# Patient Record
Sex: FEMALE | Race: WHITE | NOT HISPANIC OR LATINO | URBAN - METROPOLITAN AREA
[De-identification: names, ages, dates, MRNs, and addresses within clinical notes are randomized per-mention and may not be internally consistent; named-entity substitution may affect disease eponyms.]

---

## 2019-08-22 ENCOUNTER — MEDICAL CORRESPONDENCE (OUTPATIENT)
Dept: HEALTH INFORMATION MANAGEMENT | Facility: CLINIC | Age: 24
End: 2019-08-22

## 2020-12-03 ENCOUNTER — TRANSFERRED RECORDS (OUTPATIENT)
Dept: HEALTH INFORMATION MANAGEMENT | Facility: CLINIC | Age: 25
End: 2020-12-03

## 2022-11-03 ENCOUNTER — OFFICE VISIT (OUTPATIENT)
Dept: FAMILY MEDICINE | Facility: CLINIC | Age: 27
End: 2022-11-03
Payer: COMMERCIAL

## 2022-11-03 VITALS
SYSTOLIC BLOOD PRESSURE: 106 MMHG | TEMPERATURE: 97.4 F | OXYGEN SATURATION: 96 % | HEIGHT: 63 IN | HEART RATE: 66 BPM | BODY MASS INDEX: 22.02 KG/M2 | RESPIRATION RATE: 12 BRPM | DIASTOLIC BLOOD PRESSURE: 69 MMHG | WEIGHT: 124.25 LBS

## 2022-11-03 DIAGNOSIS — F42.2 MIXED OBSESSIONAL THOUGHTS AND ACTS: Primary | ICD-10-CM

## 2022-11-03 DIAGNOSIS — R00.2 PALPITATIONS: ICD-10-CM

## 2022-11-03 PROBLEM — G43.909 MIGRAINE HEADACHE: Status: ACTIVE | Noted: 2017-11-03

## 2022-11-03 PROBLEM — N87.9 CERVICAL ATYPIA: Status: ACTIVE | Noted: 2017-11-03

## 2022-11-03 RX ORDER — SEGESTERONE ACETATE AND ETHINYL ESTRADIOL 103; 17.4 MG/1; MG/1
RING VAGINAL
COMMUNITY
Start: 2021-10-03 | End: 2023-09-22

## 2022-11-03 RX ORDER — PAROXETINE 10 MG/1
10 TABLET, FILM COATED ORAL DAILY
COMMUNITY
Start: 2019-11-03 | End: 2022-11-03

## 2022-11-03 RX ORDER — PAROXETINE 10 MG/1
10 TABLET, FILM COATED ORAL DAILY
Qty: 90 TABLET | Refills: 3 | Status: SHIPPED | OUTPATIENT
Start: 2022-11-03 | End: 2023-09-22

## 2022-11-03 RX ORDER — PROPRANOLOL HCL 60 MG
60 CAPSULE, EXTENDED RELEASE 24HR ORAL DAILY
COMMUNITY
Start: 2022-07-03 | End: 2022-11-03

## 2022-11-03 RX ORDER — PROPRANOLOL HCL 60 MG
60 CAPSULE, EXTENDED RELEASE 24HR ORAL DAILY
Qty: 90 CAPSULE | Refills: 3 | Status: SHIPPED | OUTPATIENT
Start: 2022-11-03 | End: 2023-09-22

## 2022-11-03 RX ORDER — RIZATRIPTAN BENZOATE 5 MG/1
5 TABLET, ORALLY DISINTEGRATING ORAL PRN
COMMUNITY
Start: 2022-09-13 | End: 2023-05-23

## 2022-11-03 ASSESSMENT — ENCOUNTER SYMPTOMS
PARESTHESIAS: 0
HEARTBURN: 0
SHORTNESS OF BREATH: 0
CONSTIPATION: 0
MYALGIAS: 0
PALPITATIONS: 0
CHILLS: 0
HEMATOCHEZIA: 0
SORE THROAT: 0
DYSURIA: 0
FREQUENCY: 0
ARTHRALGIAS: 0
DIARRHEA: 0
JOINT SWELLING: 0
FEVER: 0
NERVOUS/ANXIOUS: 0
WEAKNESS: 0
COUGH: 0
ABDOMINAL PAIN: 0
HEADACHES: 1
BREAST MASS: 0
HEMATURIA: 0
DIZZINESS: 0
NAUSEA: 0
EYE PAIN: 0

## 2022-11-03 ASSESSMENT — PATIENT HEALTH QUESTIONNAIRE - PHQ9
SUM OF ALL RESPONSES TO PHQ QUESTIONS 1-9: 1
5. POOR APPETITE OR OVEREATING: MORE THAN HALF THE DAYS

## 2022-11-03 ASSESSMENT — ANXIETY QUESTIONNAIRES
1. FEELING NERVOUS, ANXIOUS, OR ON EDGE: SEVERAL DAYS
3. WORRYING TOO MUCH ABOUT DIFFERENT THINGS: MORE THAN HALF THE DAYS
2. NOT BEING ABLE TO STOP OR CONTROL WORRYING: MORE THAN HALF THE DAYS
GAD7 TOTAL SCORE: 10
7. FEELING AFRAID AS IF SOMETHING AWFUL MIGHT HAPPEN: SEVERAL DAYS
GAD7 TOTAL SCORE: 10
6. BECOMING EASILY ANNOYED OR IRRITABLE: MORE THAN HALF THE DAYS
5. BEING SO RESTLESS THAT IT IS HARD TO SIT STILL: NOT AT ALL
IF YOU CHECKED OFF ANY PROBLEMS ON THIS QUESTIONNAIRE, HOW DIFFICULT HAVE THESE PROBLEMS MADE IT FOR YOU TO DO YOUR WORK, TAKE CARE OF THINGS AT HOME, OR GET ALONG WITH OTHER PEOPLE: SOMEWHAT DIFFICULT

## 2022-11-03 NOTE — NURSING NOTE
"    27 year old  Chief Complaint   Patient presents with     [facility]     Obsessive Compulsive Disorder     Discuss who to see for treatment       Blood pressure 106/69, pulse 66, temperature 97.4  F (36.3  C), temperature source Skin, resp. rate 12, height 1.6 m (5' 2.99\"), weight 56.4 kg (124 lb 4 oz), last menstrual period 10/28/2022, SpO2 96 %. Body mass index is 22.02 kg/m .  Patient Active Problem List   Diagnosis     Cervical atypia     History of palpitations     Migraine headache       Wt Readings from Last 2 Encounters:   11/03/22 56.4 kg (124 lb 4 oz)     BP Readings from Last 3 Encounters:   11/03/22 106/69         Current Outpatient Medications   Medication     PARoxetine (PAXIL) 10 MG tablet     propranolol ER (INDERAL LA) 60 MG 24 hr capsule     rizatriptan (MAXALT-MLT) 5 MG ODT     Segesterone-Ethinyl Estradiol (ANNOVERA) 0.15-0.013 MG/24HR RING     No current facility-administered medications for this visit.       Social History     Tobacco Use     Smoking status: Never     Smokeless tobacco: Never   Vaping Use     Vaping Use: Never used   Substance Use Topics     Alcohol use: Not Currently     Drug use: Never       Health Maintenance Due   Topic Date Due     ADVANCE CARE PLANNING  Never done     HEPATITIS B IMMUNIZATION (1 of 3 - 3-dose series) Never done     HIV SCREENING  Never done     HEPATITIS C SCREENING  Never done     PAP  Never done     COVID-19 Vaccine (3 - Booster for Moderna series) 05/04/2021     INFLUENZA VACCINE (1) 09/01/2022     YEARLY PREVENTIVE VISIT  11/03/2022       No results found for: PAP      November 3, 2022 2:54 PM    "

## 2022-11-03 NOTE — PROGRESS NOTES
"CC: Establish Care and Obsessive Compulsive Disorder (Discuss who to see for treatment)    SUBJECTIVE: Dorothy is a 27 year old female who comes in to [facility] care. Prior PCP in [place], recently moved here (3 weeks ago). Records received and reviewed.     PMH, PSH, FH, allergies, and medications reviewed and updated in Epic.     Migraines - Takes propranolol 60 mg daily and rizatriptan as needed, has a migraine about 2x/month. No side effects from propranolol.     PVCs - One episode of SVT in 2016, but otherwise rare PVCs. Metoprolol first, then switched to propranolol d/t beneficial effects for migraines as well.     OCD - Diagnosed by a therapist, has struggled with anxiety her whole life and in 2017 was diagnosed. Was having panic attacks and obsessive-compulsive behaviors. Was started on paroxetine 10 mg daily. Has really helped. In grad school, had a lot of health compulsions, but lately worries about feeling contaminated nicholas with walking barefoot.      Abnormal pap smear - Had LEEP in 2017, has had 2 normal pap smears in a row, had her last pap smear this fall.      Nuvaring for contraception.  plans on vasectomy.      SH: No tobacco use. No alcohol use, quit drinking 2 years ago. No drug use. 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 Relationships: .     OBJECTIVE:   /69 (BP Location: Left arm, Patient Position: Sitting, Cuff Size: Adult Regular)   Pulse 66   Temp 97.4  F (36.3  C) (Skin)   Resp 12   Ht 1.6 m (5' 2.99\")   Wt 56.4 kg (124 lb 4 oz)   LMP 10/28/2022 (Exact Date)   SpO2 96%   BMI 22.02 kg/m    General: Alert and oriented in no acute distress.  Skin: Clear without lesions or rashes.   HEENT: PERRL. EOMI. Supple neck.  Cardio: RRR, normal S1 and S2, without murmurs, rubs, or gallops appreciated.    Resp: CTA bilaterally. Normal respiratory effort.   Psych: Mood and affect appropriate.    ASSESSMENT/PLAN:   Dorothy was seen today for establish " care and obsessive compulsive disorder. Due for pap smear next year.     Mixed obsessional thoughts and acts  Will refer to mental health  for therapy for OCD. Paroxetine refilled.   -     Adult Mental Health  Referral; Future  -     PARoxetine (PAXIL) 10 MG tablet; Take 1 tablet (10 mg) by mouth daily    Palpitations  The current medical regimen is effective;  continue present plan and medications.  -     propranolol ER (INDERAL LA) 60 MG 24 hr capsule; Take 1 capsule (60 mg) by mouth daily    Worrisome signs and symptoms were discussed with Dorothy and she was instructed to return to the clinic for concerning symptoms or to call with questions.    Return in about 1 year (around 11/3/2023) for physical.    Mireille Pena MD  Family Medicine      Answers for HPI/ROS submitted by the patient on 11/3/2022  Frequency of exercise:: 2-3 days/week  Getting at least 3 servings of Calcium per day:: Yes  Diet:: Regular (no restrictions)  Taking medications regularly:: Yes  Medication side effects:: None  Bi-annual eye exam:: NO  Dental care twice a year:: Yes  Sleep apnea or symptoms of sleep apnea:: None  abdominal pain: No  Blood in stool: No  Blood in urine: No  chest pain: No  chills: No  congestion: No  constipation: No  cough: No  diarrhea: No  dizziness: No  ear pain: No  eye pain: No  nervous/anxious: No  fever: No  frequency: No  genital sores: No  headaches: Yes  hearing loss: No  heartburn: No  arthralgias: No  joint swelling: No  peripheral edema: No  mood changes: No  myalgias: No  nausea: No  dysuria: No  palpitations: No  Skin sensation changes: No  sore throat: No  urgency: No  rash: No  shortness of breath: No  visual disturbance: No  weakness: No  pelvic pain: No  vaginal bleeding: No  vaginal discharge: No  tenderness: No  breast mass: No  breast discharge: No  Additional concerns today:: Yes  Duration of exercise:: 15-30 minutes

## 2022-11-03 NOTE — Clinical Note
Arnold Crawford, I placed a mental health referral for counseling for OCD. Dorothy has diagnosed OCD and moved here from [place] last month and wants to re-establish with a therapist. Thanks!

## 2023-01-20 ENCOUNTER — MYC MEDICAL ADVICE (OUTPATIENT)
Dept: FAMILY MEDICINE | Facility: CLINIC | Age: 28
End: 2023-01-20

## 2023-01-20 DIAGNOSIS — K12.0 CANKER SORES ORAL: Primary | ICD-10-CM

## 2023-01-20 RX ORDER — TRIAMCINOLONE ACETONIDE 0.1 %
PASTE (GRAM) DENTAL
Qty: 5 G | Refills: 1 | Status: SHIPPED | OUTPATIENT
Start: 2023-01-20 | End: 2024-04-05

## 2023-02-12 ENCOUNTER — HEALTH MAINTENANCE LETTER (OUTPATIENT)
Age: 28
End: 2023-02-12

## 2023-02-27 ENCOUNTER — VIRTUAL VISIT (OUTPATIENT)
Dept: PSYCHOLOGY | Facility: CLINIC | Age: 28
End: 2023-02-27
Attending: FAMILY MEDICINE
Payer: COMMERCIAL

## 2023-02-27 DIAGNOSIS — F41.1 GAD (GENERALIZED ANXIETY DISORDER): Primary | ICD-10-CM

## 2023-02-27 DIAGNOSIS — F42.2 MIXED OBSESSIONAL THOUGHTS AND ACTS: ICD-10-CM

## 2023-02-27 PROCEDURE — 90791 PSYCH DIAGNOSTIC EVALUATION: CPT | Mod: VID

## 2023-02-27 ASSESSMENT — COLUMBIA-SUICIDE SEVERITY RATING SCALE - C-SSRS
1. IN THE PAST MONTH, HAVE YOU WISHED YOU WERE DEAD OR WISHED YOU COULD GO TO SLEEP AND NOT WAKE UP?: NO
3. HAVE YOU BEEN THINKING ABOUT HOW YOU MIGHT KILL YOURSELF?: NO
6. HAVE YOU EVER DONE ANYTHING, STARTED TO DO ANYTHING, OR PREPARED TO DO ANYTHING TO END YOUR LIFE?: NO
2. HAVE YOU ACTUALLY HAD ANY THOUGHTS OF KILLING YOURSELF IN THE PAST MONTH?: NO
4. HAVE YOU HAD THESE THOUGHTS AND HAD SOME INTENTION OF ACTING ON THEM?: NO
5. HAVE YOU STARTED TO WORK OUT OR WORKED OUT THE DETAILS OF HOW TO KILL YOURSELF? DO YOU INTEND TO CARRY OUT THIS PLAN?: NO
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
## 2023-03-23 ENCOUNTER — VIRTUAL VISIT (OUTPATIENT)
Dept: PSYCHOLOGY | Facility: CLINIC | Age: 28
End: 2023-03-23
Payer: COMMERCIAL

## 2023-03-23 DIAGNOSIS — F41.1 GAD (GENERALIZED ANXIETY DISORDER): Primary | ICD-10-CM

## 2023-03-23 PROCEDURE — 90834 PSYTX W PT 45 MINUTES: CPT | Mod: VID

## 2023-03-23 NOTE — PROGRESS NOTES
[facility]                                     Progress Note    Patient Name: Dorothy Walker  Date: 3/23/23         Service Type: Individual      Session Start Time: 9:05 am  Session End Time: 9:45 am     Session Length: 40 min    Session #: 2    Attendees: Client attended alone    Service Modality:  Video Visit:      Provider verified identity through the following two step process.  Patient provided:  Patient is known previously to provider    Telemedicine Visit: The patient's condition can be safely assessed and treated via synchronous audio and visual telemedicine encounter.      Reason for Telemedicine Visit: Patient convenience (e.g. access to timely appointments / distance to available provider)    Originating Site (Patient Location): Patient's home    Distant Site (Provider Location): Provider Remote Setting- Home Office    Consent:  The patient/guardian has verbally consented to: the potential risks and benefits of telemedicine (video visit) versus in person care; bill my insurance or make self-payment for services provided; and responsibility for payment of non-covered services.     Patient would like the video invitation sent by:  My Chart    Mode of Communication:  Video Conference via Amwell    Distant Location (Provider):  Off-site    As the provider I attest to compliance with applicable laws and regulations related to telemedicine.    DATA  Interactive Complexity: No  Crisis: No        Progress Since Last Session (Related to Symptoms / Goals / Homework):   Symptoms: Improving reduced OCD symptoms    Homework: not assigned      Episode of Care Goals: No improvement - CONTEMPLATION (Considering change and yet undecided); Intervened by assessing the negative and positive thinking (ambivalence) about behavior change     Current / Ongoing Stressors and Concerns:   Recent COVID, caregiving mom who is doing chemotherapy, managing OCD, family expectations for time spent  together     Treatment Objective(s) Addressed in This Session:   use at least 3 coping skills for anxiety management in the next 12 weeks     Intervention:   Provided active listening and validation. Surfaced sources of anxiety specific to family of origin and boundaries.     Provided psychoeducation on grounding skills to try    Assessments completed prior to visit:  HILLARY  2/27/23  The following assessments were completed by patient for this visit:  PHQ2:   PHQ-2 ( 1999 Pf[employer]) 2/26/2023 11/3/2022   Q1: Little interest or pleasure in doing things 0 0   Q2: Feeling down, depressed or hopeless 1 0   PHQ-2 Score 1 0   Q1: Little interest or pleasure in doing things Not at all Not at all   Q2: Feeling down, depressed or hopeless Several days Not at all   PHQ-2 Score 1 0     PHQ9:   PHQ-9 SCORE 11/3/2022   PHQ-9 Total Score 1     GAD2:   JUNE-2 2/27/2023 2/27/2023 2/27/2023 3/22/2023   Feeling nervous, anxious, or on edge 1 1 1 1   Not being able to stop or control worrying 1 1 1 1   JUNE-2 Total Score 2 2 2 2     GAD7:   JUNE-7 SCORE 11/3/2022   Total Score 10     PROMIS 10-Global Health (only subscores and total score):   PROMIS-10 Scores Only 2/27/2023 2/27/2023 2/27/2023 3/22/2023   Global Mental Health Score 15 15 15 15   Global Physical Health Score 18 18 18 18   PROMIS TOTAL - SUBSCORES 33 33 33 33     [place] Suicide Severity Rating Scale (Lifetime/Recent)  [place] Suicide Severity Rating (Lifetime/Recent) 2/27/2023   Q1 Wished to be Dead (Past Month) no   Q2 Suicidal Thoughts (Past Month) no   Q3 Suicidal Thought Method no   Q4 Suicidal Intent without Specific Plan no   Q5 Suicide Intent with Specific Plan no   Q6 Suicide Behavior (Lifetime) no   Level of Risk per Screen low risk         ASSESSMENT: Current Emotional / Mental Status (status of significant symptoms):   Risk status (Self / Other harm or suicidal ideation)   Patient denies current fears or concerns for personal safety.   Patient denies current or  recent suicidal ideation or behaviors.   Patient denies current or recent homicidal ideation or behaviors.   Patient denies current or recent self injurious behavior or ideation.   Patient denies other safety concerns.   Patient reports there has been no change in risk factors since their last session.     Patient reports there has been no change in protective factors since their last session.     Recommended that patient call 911 or go to the local ED should there be a change in any of these risk factors.     Appearance:   Appropriate    Eye Contact:   Good    Psychomotor Behavior: Normal    Attitude:   Cooperative    Orientation:   All   Speech    Rate / Production: Normal     Volume:  Normal    Mood:    Anxious    Affect:    Appropriate    Thought Content:  Clear    Thought Form:  Coherent  Goal Directed  Logical    Insight:    Good      Medication Review:   No changes to current psychiatric medication(s)     Medication Compliance:   Yes     Changes in Health Issues:   None reported     Chemical Use Review:   Substance Use: Chemical use reviewed, no active concerns identified      Tobacco Use: No current tobacco use.      Diagnosis:  1. JUNE (generalized anxiety disorder)      Collateral Reports Completed:   Not Applicable    PLAN: (Patient Tasks / Therapist Tasks / Other)  Try grounding skills        ALBIN Diaz  3/23/23                                                         ______________________________________________________________________

## 2023-04-18 ENCOUNTER — VIRTUAL VISIT (OUTPATIENT)
Dept: PSYCHOLOGY | Facility: CLINIC | Age: 28
End: 2023-04-18
Payer: COMMERCIAL

## 2023-04-18 DIAGNOSIS — F41.1 GAD (GENERALIZED ANXIETY DISORDER): Primary | ICD-10-CM

## 2023-04-18 PROCEDURE — 90834 PSYTX W PT 45 MINUTES: CPT | Mod: VID

## 2023-04-18 NOTE — PROGRESS NOTES
[facility]                                     Progress Note    Patient Name: Dorothy Walker  Date: 4/18/23         Service Type: Individual      Session Start Time: 8:00 am  Session End Time: 8:45 am     Session Length: 45 min    Session #: 3    Attendees: Client attended alone    Service Modality:  Video Visit:      Provider verified identity through the following two step process.  Patient provided:  Patient is known previously to provider    Telemedicine Visit: The patient's condition can be safely assessed and treated via synchronous audio and visual telemedicine encounter.      Reason for Telemedicine Visit: Patient convenience (e.g. access to timely appointments / distance to available provider)    Originating Site (Patient Location): Patient's home    Distant Site (Provider Location): Provider Remote Setting- Home Office    Consent:  The patient/guardian has verbally consented to: the potential risks and benefits of telemedicine (video visit) versus in person care; bill my insurance or make self-payment for services provided; and responsibility for payment of non-covered services.     Patient would like the video invitation sent by:  My Chart    Mode of Communication:  [facility]    Distant Location (Provider):  Off-site    As the provider I attest to compliance with applicable laws and regulations related to telemedicine.    DATA  Interactive Complexity: No  Crisis: No        Progress Since Last Session (Related to Symptoms / Goals / Homework):   Symptoms: Improving reduced OCD symptoms    Homework: Partially completed      Episode of Care Goals: No improvement - CONTEMPLATION (Considering change and yet undecided); Intervened by assessing the negative and positive thinking (ambivalence) about behavior change     Current / Ongoing Stressors and Concerns:  Mom who has cancer and got MERSA was hospitalized for 12 days, delay of chemotherapy, managing OCD, family  expectations for time spent together     Treatment Objective(s) Addressed in This Session:   use at least 3 coping skills for anxiety management in the next 12 weeks     Intervention:  Provided active listening and validation. Processed current stressors in family of origin, including mom's cancer/complications, grandparent childcare of twins lack of boundaries, brother's lack of awareness. Surfaced sources of anxiety specific to family of origin and boundaries. Reviewed psychoeducation on grounding skills     Assessments completed prior to visit:  HILLARY  2/27/23  The following assessments were completed by patient for this visit:  PHQ2:       2/26/2023     1:14 PM 11/3/2022     6:18 AM   PHQ-2 ( 1999 Pfizer)   Q1: Little interest or pleasure in doing things 0 0   Q2: Feeling down, depressed or hopeless 1 0   PHQ-2 Score 1 0   Q1: Little interest or pleasure in doing things Not at all Not at all   Q2: Feeling down, depressed or hopeless Several days Not at all   PHQ-2 Score 1 0     PHQ9:       11/3/2022     6:18 AM   PHQ-9 SCORE   PHQ-9 Total Score 1     GAD2:       2/27/2023     6:05 AM 3/22/2023     9:46 AM   JUNE-2   Feeling nervous, anxious, or on edge 1    1    1 1   Not being able to stop or control worrying 1    1    1 1   JUNE-2 Total Score 2    2    2 2     GAD7:       11/3/2022     6:18 AM   JUNE-7 SCORE   Total Score 10     PROMIS 10-Global Health (only subscores and total score):       2/27/2023     6:08 AM 3/22/2023     9:46 AM   PROMIS-10 Scores Only   Global Mental Health Score 15    15    15 15   Global Physical Health Score 18    18    18 18   PROMIS TOTAL - SUBSCORES 33    33    33 33     [place] Suicide Severity Rating Scale (Lifetime/Recent)      2/27/2023    11:00 AM   [place] Suicide Severity Rating (Lifetime/Recent)   Q1 Wished to be Dead (Past Month) no   Q2 Suicidal Thoughts (Past Month) no   Q3 Suicidal Thought Method no   Q4 Suicidal Intent without Specific Plan no   Q5 Suicide Intent with  Specific Plan no   Q6 Suicide Behavior (Lifetime) no   Level of Risk per Screen low risk         ASSESSMENT: Current Emotional / Mental Status (status of significant symptoms):   Risk status (Self / Other harm or suicidal ideation)   Patient denies current fears or concerns for personal safety.   Patient denies current or recent suicidal ideation or behaviors.   Patient denies current or recent homicidal ideation or behaviors.   Patient denies current or recent self injurious behavior or ideation.   Patient denies other safety concerns.   Patient reports there has been no change in risk factors since their last session.     Patient reports there has been no change in protective factors since their last session.     Recommended that patient call 911 or go to the local ED should there be a change in any of these risk factors.     Appearance:   Appropriate    Eye Contact:   Good    Psychomotor Behavior: Normal    Attitude:   Cooperative    Orientation:   All   Speech    Rate / Production: Normal     Volume:  Normal    Mood:    Anxious    Affect:    Appropriate    Thought Content:  Clear    Thought Form:  Coherent  Goal Directed  Logical    Insight:    Good      Medication Review:   No changes to current psychiatric medication(s)     Medication Compliance:   Yes     Changes in Health Issues:   None reported     Chemical Use Review:   Substance Use: Chemical use reviewed, no active concerns identified      Tobacco Use: No current tobacco use.      Diagnosis:  1. JUNE (generalized anxiety disorder)      Collateral Reports Completed:   Not Applicable    PLAN: (Patient Tasks / Therapist Tasks / Other)  Try grounding skills        Stephanie Manjarrez, EDUARDOSW  4/18/23                                                         ______________________________________________________________________

## 2023-05-18 ENCOUNTER — VIRTUAL VISIT (OUTPATIENT)
Dept: PSYCHOLOGY | Facility: CLINIC | Age: 28
End: 2023-05-18
Payer: COMMERCIAL

## 2023-05-18 DIAGNOSIS — F41.1 GAD (GENERALIZED ANXIETY DISORDER): Primary | ICD-10-CM

## 2023-05-18 PROCEDURE — 90834 PSYTX W PT 45 MINUTES: CPT | Mod: VID

## 2023-05-18 NOTE — PROGRESS NOTES
[facility]                                     Progress Note    Patient Name: Dorothy Walker  Date: 5/18/23         Service Type: Individual      Session Start Time: 3:05 pm  Session End Time: 3:50 am     Session Length: 45 min    Session #: 4    Attendees: Client attended alone    Service Modality:  Video Visit:      Provider verified identity through the following two step process.  Patient provided:  Patient is known previously to provider    Telemedicine Visit: The patient's condition can be safely assessed and treated via synchronous audio and visual telemedicine encounter.      Reason for Telemedicine Visit: Patient convenience (e.g. access to timely appointments / distance to available provider)    Originating Site (Patient Location): Patient's home    Distant Site (Provider Location): Provider Remote Setting- Home Office    Consent:  The patient/guardian has verbally consented to: the potential risks and benefits of telemedicine (video visit) versus in person care; bill my insurance or make self-payment for services provided; and responsibility for payment of non-covered services.     Patient would like the video invitation sent by:  My Chart    Mode of Communication:  Video Conference via Amwell    Distant Location (Provider):  Off-site    As the provider I attest to compliance with applicable laws and regulations related to telemedicine.    DATA  Interactive Complexity: No  Crisis: No        Progress Since Last Session (Related to Symptoms / Goals / Homework):   Symptoms: Improving reduced OCD symptoms    Homework: Partially completed      Episode of Care Goals: No improvement - CONTEMPLATION (Considering change and yet undecided); Intervened by assessing the negative and positive thinking (ambivalence) about behavior change     Current / Ongoing Stressors and Concerns:  Milestone birthday this week; reflecting on disinterest regarding having a child. Old friend aggressively  attempting to restart friendship Mom who has cancer and got MERSA was hospitalized for 12 days, delay of chemotherapy, managing OCD, family expectations for time spent together     Treatment Objective(s) Addressed in This Session:   use at least 3 coping skills for anxiety management in the next 12 weeks     Intervention:  Provided active listening and validation. Processed friend's maladaptive behaviors and patient's work on boundaries and perspective taking. Validated beliefs and experiencing influencing perspectives.  Supported thought stopping and checking the facts, dismissing guilt-driven thoughts.    Assessments completed prior to visit:  HILLARY  2/27/23  The following assessments were completed by patient for this visit:  PHQ2:       2/26/2023     1:14 PM 11/3/2022     6:18 AM   PHQ-2 ( 1999 Pfizer)   Q1: Little interest or pleasure in doing things 0 0   Q2: Feeling down, depressed or hopeless 1 0   PHQ-2 Score 1 0   Q1: Little interest or pleasure in doing things Not at all Not at all   Q2: Feeling down, depressed or hopeless Several days Not at all   PHQ-2 Score 1 0     PHQ9:       11/3/2022     6:18 AM   PHQ-9 SCORE   PHQ-9 Total Score 1     GAD2:       2/27/2023     6:05 AM 3/22/2023     9:46 AM   JUNE-2   Feeling nervous, anxious, or on edge 1    1    1 1   Not being able to stop or control worrying 1    1    1 1   JUNE-2 Total Score 2    2    2 2     GAD7:       11/3/2022     6:18 AM   JUNE-7 SCORE   Total Score 10     PROMIS 10-Global Health (only subscores and total score):       2/27/2023     6:08 AM 3/22/2023     9:46 AM   PROMIS-10 Scores Only   Global Mental Health Score 15    15    15 15   Global Physical Health Score 18    18    18 18   PROMIS TOTAL - SUBSCORES 33    33    33 33     [place] Suicide Severity Rating Scale (Lifetime/Recent)      2/27/2023    11:00 AM   [place] Suicide Severity Rating (Lifetime/Recent)   Q1 Wished to be Dead (Past Month) no   Q2 Suicidal Thoughts (Past Month) no   Q3  Suicidal Thought Method no   Q4 Suicidal Intent without Specific Plan no   Q5 Suicide Intent with Specific Plan no   Q6 Suicide Behavior (Lifetime) no   Level of Risk per Screen low risk         ASSESSMENT: Current Emotional / Mental Status (status of significant symptoms):   Risk status (Self / Other harm or suicidal ideation)   Patient denies current fears or concerns for personal safety.   Patient denies current or recent suicidal ideation or behaviors.   Patient denies current or recent homicidal ideation or behaviors.   Patient denies current or recent self injurious behavior or ideation.   Patient denies other safety concerns.   Patient reports there has been no change in risk factors since their last session.     Patient reports there has been no change in protective factors since their last session.     Recommended that patient call 911 or go to the local ED should there be a change in any of these risk factors.     Appearance:   Appropriate    Eye Contact:   Good    Psychomotor Behavior: Normal    Attitude:   Cooperative    Orientation:   All   Speech    Rate / Production: Normal     Volume:  Normal    Mood:    Anxious    Affect:    Appropriate    Thought Content:  Clear    Thought Form:  Coherent  Goal Directed  Logical    Insight:    Good      Medication Review:   No changes to current psychiatric medication(s)     Medication Compliance:   Yes     Changes in Health Issues:   None reported     Chemical Use Review:   Substance Use: Chemical use reviewed, no active concerns identified      Tobacco Use: No current tobacco use.      Diagnosis:  1. JUNE (generalized anxiety disorder)      Collateral Reports Completed:   Not Applicable    PLAN: (Patient Tasks / Therapist Tasks / Other)  Try grounding skills. Continue boundaries with hometown friend.        Stephanie Manjarrez, LICSW  5/18/23                                                         ______________________________________________________________________

## 2023-05-23 ENCOUNTER — MYC REFILL (OUTPATIENT)
Dept: FAMILY MEDICINE | Facility: CLINIC | Age: 28
End: 2023-05-23

## 2023-05-23 DIAGNOSIS — G43.009 MIGRAINE WITHOUT AURA AND WITHOUT STATUS MIGRAINOSUS, NOT INTRACTABLE: Primary | ICD-10-CM

## 2023-05-23 RX ORDER — RIZATRIPTAN BENZOATE 5 MG/1
5 TABLET, ORALLY DISINTEGRATING ORAL
Qty: 10 TABLET | Refills: 3 | Status: SHIPPED | OUTPATIENT
Start: 2023-05-23 | End: 2023-09-22

## 2023-05-23 NOTE — TELEPHONE ENCOUNTER
Medication requested: rizatriptan (MAXALT-MLT) 5 MG ODT  Last office visit: 11/3/23  [facility] appointments: none  Medication last refilled: historical  Last qualifying labs: N/A    Routing refill request to provider for review/approval because:  Medication is reported/historical    Celestino MORAN, RN  05/23/23 1:42 PM

## 2023-09-22 ENCOUNTER — OFFICE VISIT (OUTPATIENT)
Dept: FAMILY MEDICINE | Facility: CLINIC | Age: 28
End: 2023-09-22
Attending: FAMILY MEDICINE
Payer: COMMERCIAL

## 2023-09-22 ENCOUNTER — PATIENT OUTREACH (OUTPATIENT)
Dept: ONCOLOGY | Facility: CLINIC | Age: 28
End: 2023-09-22

## 2023-09-22 VITALS
BODY MASS INDEX: 23.37 KG/M2 | DIASTOLIC BLOOD PRESSURE: 71 MMHG | WEIGHT: 127 LBS | HEART RATE: 72 BPM | SYSTOLIC BLOOD PRESSURE: 112 MMHG | HEIGHT: 62 IN

## 2023-09-22 DIAGNOSIS — F42.2 MIXED OBSESSIONAL THOUGHTS AND ACTS: ICD-10-CM

## 2023-09-22 DIAGNOSIS — G43.009 MIGRAINE WITHOUT AURA AND WITHOUT STATUS MIGRAINOSUS, NOT INTRACTABLE: ICD-10-CM

## 2023-09-22 DIAGNOSIS — R00.2 PALPITATIONS: ICD-10-CM

## 2023-09-22 DIAGNOSIS — Z12.4 SCREENING FOR CERVICAL CANCER: ICD-10-CM

## 2023-09-22 DIAGNOSIS — Z30.09 ENCOUNTER FOR GENERAL COUNSELING AND ADVICE ON CONTRACEPTIVE MANAGEMENT: ICD-10-CM

## 2023-09-22 DIAGNOSIS — Z00.00 ROUTINE GENERAL MEDICAL EXAMINATION AT A HEALTH CARE FACILITY: Primary | ICD-10-CM

## 2023-09-22 DIAGNOSIS — Z80.3 FAMILY HISTORY OF BREAST CANCER IN MOTHER: ICD-10-CM

## 2023-09-22 PROCEDURE — G0463 HOSPITAL OUTPT CLINIC VISIT: HCPCS | Mod: 25 | Performed by: FAMILY MEDICINE

## 2023-09-22 PROCEDURE — 87624 HPV HI-RISK TYP POOLED RSLT: CPT | Performed by: FAMILY MEDICINE

## 2023-09-22 PROCEDURE — G0145 SCR C/V CYTO,THINLAYER,RESCR: HCPCS | Performed by: FAMILY MEDICINE

## 2023-09-22 PROCEDURE — 99395 PREV VISIT EST AGE 18-39: CPT | Performed by: FAMILY MEDICINE

## 2023-09-22 PROCEDURE — G0008 ADMIN INFLUENZA VIRUS VAC: HCPCS

## 2023-09-22 PROCEDURE — 90686 IIV4 VACC NO PRSV 0.5 ML IM: CPT

## 2023-09-22 PROCEDURE — 250N000011 HC RX IP 250 OP 636

## 2023-09-22 RX ORDER — PROPRANOLOL HCL 60 MG
60 CAPSULE, EXTENDED RELEASE 24HR ORAL DAILY
Qty: 90 CAPSULE | Refills: 3 | Status: SHIPPED | OUTPATIENT
Start: 2023-09-22 | End: 2024-09-23

## 2023-09-22 RX ORDER — ETONOGESTREL AND ETHINYL ESTRADIOL VAGINAL RING .015; .12 MG/D; MG/D
RING VAGINAL
Qty: 3 EACH | Refills: 4 | Status: SHIPPED | OUTPATIENT
Start: 2023-09-22 | End: 2024-09-23

## 2023-09-22 RX ORDER — RIZATRIPTAN BENZOATE 5 MG/1
5 TABLET, ORALLY DISINTEGRATING ORAL
Qty: 10 TABLET | Refills: 3 | Status: SHIPPED | OUTPATIENT
Start: 2023-09-22 | End: 2024-09-23

## 2023-09-22 RX ORDER — SEGESTERONE ACETATE AND ETHINYL ESTRADIOL 103; 17.4 MG/1; MG/1
RING VAGINAL
Status: CANCELLED | OUTPATIENT
Start: 2023-09-22

## 2023-09-22 RX ORDER — PAROXETINE 10 MG/1
10 TABLET, FILM COATED ORAL DAILY
Qty: 90 TABLET | Refills: 3 | Status: SHIPPED | OUTPATIENT
Start: 2023-09-22 | End: 2024-09-23

## 2023-09-22 NOTE — PROGRESS NOTES
"CC: Physical      Dorothy is a 28 year old female who presents to clinic for an annual exam.       Chronic health conditions:  Migraines - Takes propranolol 60 mg daily and rizatriptan as needed, has a migraine about 1-2x/month. No side effects from propranolol.      PVCs - One episode of SVT in 2016, but otherwise rare PVCs. Metoprolol first, then switched to propranolol d/t beneficial effects for migraines as well.      OCD - Diagnosed by a therapist, has struggled with anxiety her whole life and in 2017 was diagnosed. Was having panic attacks and obsessive-compulsive behaviors. Was started on paroxetine 10 mg daily. Has really helped. In grad school, had a lot of health compulsions, but lately worries about feeling contaminated nicholas with walking barefoot.        We reviewed and updated her medication list as necessary. Her past medical and surgical history as well as her family history were reviewed and updated as necessary.    Ob/gyn history:  LMP: Patient's last menstrual period was 09/10/2023.   Concern for STIs: No  Pap smears: last year, normal  History of abnormal paps: LEEP in 2018, 2 normal pap smears since then  Contraception: Nuvaring  Breast cancer screening: at age 38; mom recently diagnosed with breast cancer in January at age 58 (HER2+, stage 1) positive for CHEK2    Other HM & health-related habits:  Tobacco: None    Alcohol: none  Drug use: no   Vaccines: Will update flu shot today  Hep C & HIV screening: Has not had, low risk  DM screenin/3/2021 - A1C 4.8%  Lipids: 11/3/2021 - , , HDL 93,   Colon cancer screening: at age 43  Bone density screening: vitamin D discussed      OBJECTIVE:   /71   Pulse 72   Ht 1.575 m (5' 2\")   Wt 57.6 kg (127 lb)   LMP 09/10/2023   BMI 23.23 kg/m     General: In NAD.  Cooperative and pleasant.  HEENT: Normocephalic, atraumatic. Oropharynx moist without lesions or exudate. TMs normal. Supple neck, without lymphadenopathy or thyromegaly. "    Breasts: No obvious masses, axillary adenopathy or fibrocystic changes.    Lungs: CTA bilaterally.  CV: RRR, normal S1 and S2, without murmurs, rubs, or gallops appreciated.    Abdomen: Soft, NT, ND.  No masses or hepatosplenomegaly appreciated.    Gyn: Normal appearing external genitalia without lesion.  Vaginal mucosa pink and moist.  Cervix visualized and Pap performed.   Extremities: Warm and well perfused, without deformity, edema.  Skin: Clear without lesions or rashes.   Neuro: Grossly intact, nonfocal.  Psych: Mood and affect appropriate.      ASSESSMENT AND PLAN:   Dorothy was seen today for physical.    Routine general medical examination at a health care facility  Health maintenance updates: Flu shot & pap smear today.     Encounter for general counseling and advice on contraceptive management  Nuvaring for contraception refilled.   -     etonogestrel-ethinyl estradiol (NUVARING) 0.12-0.015 MG/24HR vaginal ring; Insert one (1) ring vaginally and leave in place for 3 consecutive weeks (21 days), then remove for 1 week.    Migraine without aura and without status migrainosus, not intractable  The current medical regimen is effective;  continue present plan and medications.  -     rizatriptan (MAXALT-MLT) 5 MG ODT; Take 1 tablet (5 mg) by mouth at onset of headache for migraine    Palpitations  The current medical regimen is effective;  continue present plan and medications.  -     propranolol ER (INDERAL LA) 60 MG 24 hr capsule; Take 1 capsule (60 mg) by mouth daily    Mixed obsessional thoughts and acts  The current medical regimen is effective;  continue present plan and medications.  -     PARoxetine (PAXIL) 10 MG tablet; Take 1 tablet (10 mg) by mouth daily    Family history of breast cancer in mother  Mother with recent dx of breast cancer at age 58 and found to be CHEK2 positive. Recommend referral to genetic counseling for evaluation and possible genetic testing.   -     Adult Oncology/Hematology   Referral; Future      Return in about 1 year (around 9/22/2024) for physical.    Mireille Pena MD  Family Medicine

## 2023-09-22 NOTE — PROGRESS NOTES
New Patient: Genetic Counseling/Cancer Risk Management Nurse Navigator Note    Referring provider:   Mireille Pena MD [facility] [phone]     Referred to (specialty): Genetic Counseling    Requested provider (if applicable): n/a    Date Referral Received: 9/22/2023    Evaluation for :   mom with breast cancer, positive CHEK2 mutation       Writer received referral, reviewed for   appropriate plan, and sent to New Patient   Scheduling for completion

## 2023-09-26 LAB
BKR LAB AP GYN ADEQUACY: NORMAL
BKR LAB AP GYN INTERPRETATION: NORMAL
BKR LAB AP HPV REFLEX: NORMAL
BKR LAB AP LMP: NORMAL
BKR LAB AP PREVIOUS ABNORMAL: NORMAL
PATH REPORT.COMMENTS IMP SPEC: NORMAL
PATH REPORT.COMMENTS IMP SPEC: NORMAL
PATH REPORT.RELEVANT HX SPEC: NORMAL

## 2023-09-28 ENCOUNTER — PATIENT OUTREACH (OUTPATIENT)
Dept: OBGYN | Facility: CLINIC | Age: 28
End: 2023-09-28
Payer: COMMERCIAL

## 2023-09-28 PROBLEM — R87.810 CERVICAL HIGH RISK HPV (HUMAN PAPILLOMAVIRUS) TEST POSITIVE: Status: ACTIVE | Noted: 2023-09-28

## 2023-09-28 LAB
HUMAN PAPILLOMA VIRUS 16 DNA: NEGATIVE
HUMAN PAPILLOMA VIRUS 18 DNA: NEGATIVE
HUMAN PAPILLOMA VIRUS FINAL DIAGNOSIS: ABNORMAL
HUMAN PAPILLOMA VIRUS OTHER HR: POSITIVE

## 2023-09-28 NOTE — TELEPHONE ENCOUNTER
LEEP (in 2018 er office visit note)   9/22/23 NIL pap, + HR HPV (not 16 or 18). Plan colp due by 12/22/23

## 2023-10-01 ENCOUNTER — MYC MEDICAL ADVICE (OUTPATIENT)
Dept: FAMILY MEDICINE | Facility: CLINIC | Age: 28
End: 2023-10-01
Payer: COMMERCIAL

## 2023-10-10 NOTE — TELEPHONE ENCOUNTER
Pap 2018 - ASCUS, other HPV positive.   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   LEEP 11/2018.    Pap 5/2019 NILM (no HPV testing completed), 11/2019 NILM (no HPV testing completed), 12/2020 ASCUS HPV negative.

## 2023-10-22 NOTE — PROGRESS NOTES
"[facility]    HPI: Dorothy Walker is a 28 year old G0 who presents to clinic today to discuss recent positive high risk HPV result and options for recommended procedures. She expressed lack of education and expectations around previous LEEP and colposcopies. States she has concern for pain and anxiety around future procedure. If she were to have another procedure in the future she is wondering about meds for anxiety. Previously had cramping after colposcopy but didn't take any premeds.    . No new partners.  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     Gyn History:   Contraception: Nuvaring  Sexual activity: with monogamous male partner     Pap history:   2018 ASCUS, other HPV +  Colpo: microscopic focus of severe dysplasia (not graded)   11/2018 LEEP \"residual low grade dysplasia and koilocytosis c/w HPV associated changes\", margins are free of dysplastic changes  5/19 NILM, no HPV testing  11/19 NILM, no HPV testing  12/20 ASCUS, HPV neg  9/22/23 NILM, HR other +     PMH, PSH, Family history, Social history, medications and allergies were reviewed and updated in EMR.     Objective:   /76   Pulse 83   Wt 57.2 kg (126 lb)   LMP 09/10/2023   BMI 23.05 kg/m    General: Healthy appearing. Alert, oriented. Affect is appropriate. Patient is interactive with providers and asking appropriate questions.    Assessment/Plan:   Dorothy Walker is a 28 year old G0 female with a history of abnormal pap smears and recent HR HPV + on her last pap on 9/22/23. Discussed patient's previous experiences with procedures and acknowledged the significant anxiety around future procedures. Given her history of ASCUS and LEEP in 2018 showed only a small foci of low grade dysplasia with subsequent negative HPV testing, it is not clear what her risk would be. ASCCP guidance states to individualize recommendations taking her entire history into account.   - ASCCP recs: "    - with last pap only: 1 year follow-up, risk 5 year risk of CIN3+ 4.8%   - Last 2 paps: 1 year follow-up, risk of CIN3+ is 2.4%   - Last 3 paps: 1 year follow-up   - If LEEP had shown CIN3: colposcopy with risk of CIN3+ 5.8%     Reviewed the ASCCP risk of CIN3+ with her and discussed option to do a repeat pap in one year (09/2024) and if it comes back HPV + then we could pursue a colposcopy at that time.     Provided patient with ACOG resources and knows to follow up if she starts to have additional symptoms. If colpo is needed offered anxiolytic prior.     Received her HPV vaccine in the 2000s in [place].     Patient seen and discussed with Dr. Vega.     Crystal Lowry, MS3  [employer].     I was present with the medical student who participated in the service and in the documentation of the note. I have verified the history and personally performed the physical exam and medical decision making. I agree with the assessment and plan of care as documented in the note.    Jeanine Vega MD

## 2023-10-30 ENCOUNTER — OFFICE VISIT (OUTPATIENT)
Dept: OBGYN | Facility: CLINIC | Age: 28
End: 2023-10-30
Attending: STUDENT IN AN ORGANIZED HEALTH CARE EDUCATION/TRAINING PROGRAM
Payer: COMMERCIAL

## 2023-10-30 VITALS
HEART RATE: 83 BPM | BODY MASS INDEX: 23.05 KG/M2 | DIASTOLIC BLOOD PRESSURE: 76 MMHG | WEIGHT: 126 LBS | SYSTOLIC BLOOD PRESSURE: 112 MMHG

## 2023-10-30 DIAGNOSIS — R87.810 CERVICAL HIGH RISK HPV (HUMAN PAPILLOMAVIRUS) TEST POSITIVE: Primary | ICD-10-CM

## 2023-10-30 DIAGNOSIS — Z98.890 HISTORY OF LOOP ELECTRICAL EXCISION PROCEDURE (LEEP): ICD-10-CM

## 2023-10-30 PROCEDURE — 99203 OFFICE O/P NEW LOW 30 MIN: CPT | Performed by: STUDENT IN AN ORGANIZED HEALTH CARE EDUCATION/TRAINING PROGRAM

## 2023-10-30 PROCEDURE — G0463 HOSPITAL OUTPT CLINIC VISIT: HCPCS | Performed by: STUDENT IN AN ORGANIZED HEALTH CARE EDUCATION/TRAINING PROGRAM

## 2023-10-30 PROCEDURE — 99213 OFFICE O/P EST LOW 20 MIN: CPT | Performed by: STUDENT IN AN ORGANIZED HEALTH CARE EDUCATION/TRAINING PROGRAM

## 2023-10-30 ASSESSMENT — PAIN SCALES - GENERAL: PAINLEVEL: NO PAIN (0)

## 2023-10-30 NOTE — PATIENT INSTRUCTIONS
[email or web address]    ASCCP   Just last pap 1 year follow-up, risk 5 year of CIN3+ 4.8%  Last 2 1 year follow-up, risk of CIN3+ is 2.4%  Last 3 1 year follow-up    Including CIN3 w/colpo Risk of CIN3+ is 5.8%    Cut-off for colpo is 5%

## 2024-02-26 PROBLEM — Z98.890 H/O LEEP: Status: ACTIVE | Noted: 2023-09-28

## 2024-04-05 DIAGNOSIS — K12.0 CANKER SORES ORAL: ICD-10-CM

## 2024-04-05 RX ORDER — TRIAMCINOLONE ACETONIDE 0.1 %
PASTE (GRAM) DENTAL
Qty: 5 G | Refills: 1 | Status: SHIPPED | OUTPATIENT
Start: 2024-04-05

## 2024-04-18 ENCOUNTER — OFFICE VISIT (OUTPATIENT)
Dept: FAMILY MEDICINE | Facility: CLINIC | Age: 29
End: 2024-04-18
Payer: COMMERCIAL

## 2024-04-18 VITALS
TEMPERATURE: 98.2 F | SYSTOLIC BLOOD PRESSURE: 108 MMHG | HEART RATE: 68 BPM | OXYGEN SATURATION: 99 % | WEIGHT: 127 LBS | BODY MASS INDEX: 23.37 KG/M2 | DIASTOLIC BLOOD PRESSURE: 71 MMHG | HEIGHT: 62 IN

## 2024-04-18 DIAGNOSIS — K12.0 RECURRENT APHTHOUS STOMATITIS: Primary | ICD-10-CM

## 2024-04-18 LAB
ERYTHROCYTE [DISTWIDTH] IN BLOOD BY AUTOMATED COUNT: 11.9 % (ref 10–15)
FOLATE SERPL-MCNC: 17.9 NG/ML (ref 4.6–34.8)
HCT VFR BLD AUTO: 38 % (ref 35–47)
HGB BLD-MCNC: 13.1 G/DL (ref 11.7–15.7)
MCH RBC QN AUTO: 31 PG (ref 26.5–33)
MCHC RBC AUTO-ENTMCNC: 34.5 G/DL (ref 31.5–36.5)
MCV RBC AUTO: 90 FL (ref 78–100)
PLATELET # BLD AUTO: 264 10E3/UL (ref 150–450)
RBC # BLD AUTO: 4.23 10E6/UL (ref 3.8–5.2)
WBC # BLD AUTO: 5.8 10E3/UL (ref 4–11)

## 2024-04-18 PROCEDURE — 83550 IRON BINDING TEST: CPT | Performed by: FAMILY MEDICINE

## 2024-04-18 PROCEDURE — 82306 VITAMIN D 25 HYDROXY: CPT | Performed by: FAMILY MEDICINE

## 2024-04-18 PROCEDURE — 82746 ASSAY OF FOLIC ACID SERUM: CPT | Performed by: FAMILY MEDICINE

## 2024-04-18 PROCEDURE — 82607 VITAMIN B-12: CPT | Performed by: FAMILY MEDICINE

## 2024-04-18 PROCEDURE — 82728 ASSAY OF FERRITIN: CPT | Performed by: FAMILY MEDICINE

## 2024-04-18 RX ORDER — LIDOCAINE HYDROCHLORIDE 20 MG/ML
SOLUTION OROPHARYNGEAL
Status: CANCELLED | OUTPATIENT
Start: 2024-04-18

## 2024-04-18 RX ORDER — DEXAMETHASONE 0.5 MG/5ML
ELIXIR ORAL
Qty: 237 ML | Refills: 0 | Status: SHIPPED | OUTPATIENT
Start: 2024-04-18

## 2024-04-18 NOTE — NURSING NOTE
"28 year old  Chief Complaint   Patient presents with    Mouth Lesions     An ongoing issue throughout her life, which are currently healed.       Blood pressure 108/71, pulse 68, temperature 98.2  F (36.8  C), temperature source Skin, height 1.575 m (5' 2\"), weight 57.6 kg (127 lb), SpO2 99%. Body mass index is 23.23 kg/m .  Patient Active Problem List   Diagnosis    Cervical atypia    History of palpitations    Migraine headache    Mixed obsessional thoughts and acts    H/O LEEP       Wt Readings from Last 2 Encounters:   04/18/24 57.6 kg (127 lb)   10/30/23 57.2 kg (126 lb)     BP Readings from Last 3 Encounters:   04/18/24 108/71   10/30/23 112/76   09/22/23 112/71         Current Outpatient Medications   Medication Sig Dispense Refill    Cyanocobalamin (B-12 PO)       etonogestrel-ethinyl estradiol (NUVARING) 0.12-0.015 MG/24HR vaginal ring Insert one (1) ring vaginally and leave in place for 3 consecutive weeks (21 days), then remove for 1 week. 3 each 4    PARoxetine (PAXIL) 10 MG tablet Take 1 tablet (10 mg) by mouth daily 90 tablet 3    propranolol ER (INDERAL LA) 60 MG 24 hr capsule Take 1 capsule (60 mg) by mouth daily 90 capsule 3    rizatriptan (MAXALT-MLT) 5 MG ODT Take 1 tablet (5 mg) by mouth at onset of headache for migraine 10 tablet 3    triamcinolone (KENALOG) 0.1 % paste APPLY TO ORAL ULCERS TWICE DAILY AS NEEDED 5 g 1     No current facility-administered medications for this visit.       Social History     Tobacco Use    Smoking status: Never    Smokeless tobacco: Never   Vaping Use    Vaping status: Never Used   Substance Use Topics    Alcohol use: Not Currently    Drug use: Never       Health Maintenance Due   Topic Date Due    ADVANCE CARE PLANNING  Never done    HIV SCREENING  Never done    HEPATITIS C SCREENING  Never done    HEPATITIS B IMMUNIZATION (1 of 3 - 19+ 3-dose series) Never done    COVID-19 Vaccine (3 - 2023-24 season) 09/01/2023       No results found for: \"PAP\"      April 18, " 2024 9:10 AM

## 2024-04-18 NOTE — PROGRESS NOTES
"CC: Mouth Lesions (An ongoing issue throughout her life, which are currently healed.)      SUBJECTIVE: Dorothy is a 28 year old female who comes in with the following concerns:    Has had canker sores on and off her whole life. Will go through months when she doesn't have some. Last week, she had 3 canker sores which made it difficult to talk and eat. She does use triamcinolone paste which does help for sure but they take a few days to resolve.     Wondering if she's deficient in any vitamins. She can tell some fruits will trigger symptoms. Doesn't think she has a gluten allergy. She started a B12 supplement last week to see if that would help. Wondering if she should be referred to ENT or derm?    Her mom also got a lot of canker sores, but grew out of them.       OBJECTIVE:   /71 (BP Location: Right arm, Patient Position: Sitting, Cuff Size: Adult Regular)   Pulse 68   Temp 98.2  F (36.8  C) (Skin)   Ht 1.575 m (5' 2\")   Wt 57.6 kg (127 lb)   SpO2 99%   BMI 23.23 kg/m    General: Alert and oriented in no acute distress.  Lymph: No cervical or submental or submandibular lymphadenopathy.   ENT: Oropharynx moist without lesions or exudate. Supple neck.      ASSESSMENT/PLAN:   Dorothy was seen today for mouth lesions.    Recurrent aphthous stomatitis  -     dexAMETHasone (DECADRON) 0.5 MG/5ML elixir; 5 mL swish and spit three to four times daily for 3 days. Keep the medication in the mouth for five minutes prior to spitting it out. Do not rinse afterward and avoid eating or drinking for 30 minutes  -     Vitamin D deficiency screening; Future  -     Vitamin B12; Future  -     Folate; Future  -     Zinc; Future  -     CBC with platelets; Future  -     Ferritin; Future  -     Iron and iron binding capacity; Future    Will r/o deficiencies that could be contributing with labs ordered as above. Can also trial Decadron swish and spit instead of triamcinolone, as she can only use the triamcinolone at night because " it's a paste, but the Decadron she can use throughout the day since she can swish and spit, and may help with sooner resolution of symptoms. Also check to make sure using SLS-free toothpaste. Consider ENT (or maybe derm?) consult if sx persisting, worsening. Worrisome signs and symptoms were discussed with Dorothy and she was instructed to return to the clinic for concerning symptoms or to call with questions. Return if symptoms worsen or fail to improve.      Mireille Pena MD  Family Medicine

## 2024-04-19 LAB
FERRITIN SERPL-MCNC: 18 NG/ML (ref 6–175)
IRON BINDING CAPACITY (ROCHE): 463 UG/DL (ref 240–430)
IRON SATN MFR SERPL: 26 % (ref 15–46)
IRON SERPL-MCNC: 121 UG/DL (ref 37–145)
VIT B12 SERPL-MCNC: 302 PG/ML (ref 232–1245)
VIT D+METAB SERPL-MCNC: 28 NG/ML (ref 20–50)

## 2024-04-20 LAB — ZINC SERPL-MCNC: 69.8 UG/DL

## 2024-09-05 ENCOUNTER — PATIENT OUTREACH (OUTPATIENT)
Dept: OBGYN | Facility: CLINIC | Age: 29
End: 2024-09-05
Payer: COMMERCIAL

## 2024-09-23 ENCOUNTER — OFFICE VISIT (OUTPATIENT)
Dept: OBGYN | Facility: CLINIC | Age: 29
End: 2024-09-23
Attending: ADVANCED PRACTICE MIDWIFE
Payer: COMMERCIAL

## 2024-09-23 VITALS
WEIGHT: 129.8 LBS | HEART RATE: 71 BPM | BODY MASS INDEX: 23.89 KG/M2 | SYSTOLIC BLOOD PRESSURE: 108 MMHG | DIASTOLIC BLOOD PRESSURE: 76 MMHG | HEIGHT: 62 IN

## 2024-09-23 DIAGNOSIS — F42.2 MIXED OBSESSIONAL THOUGHTS AND ACTS: ICD-10-CM

## 2024-09-23 DIAGNOSIS — Z12.4 SCREENING FOR MALIGNANT NEOPLASM OF CERVIX: ICD-10-CM

## 2024-09-23 DIAGNOSIS — Z87.898 HISTORY OF PALPITATIONS: ICD-10-CM

## 2024-09-23 DIAGNOSIS — Z00.00 VISIT FOR PREVENTIVE HEALTH EXAMINATION: Primary | ICD-10-CM

## 2024-09-23 DIAGNOSIS — Z98.890 H/O LEEP: ICD-10-CM

## 2024-09-23 DIAGNOSIS — G43.009 MIGRAINE WITHOUT AURA AND WITHOUT STATUS MIGRAINOSUS, NOT INTRACTABLE: ICD-10-CM

## 2024-09-23 DIAGNOSIS — R00.2 PALPITATIONS: ICD-10-CM

## 2024-09-23 DIAGNOSIS — Z01.419 ENCOUNTER FOR GYNECOLOGICAL EXAMINATION WITHOUT ABNORMAL FINDING: ICD-10-CM

## 2024-09-23 DIAGNOSIS — G43.109 MIGRAINE WITH AURA AND WITHOUT STATUS MIGRAINOSUS, NOT INTRACTABLE: ICD-10-CM

## 2024-09-23 PROCEDURE — 250N000011 HC RX IP 250 OP 636

## 2024-09-23 PROCEDURE — 87624 HPV HI-RISK TYP POOLED RSLT: CPT | Performed by: ADVANCED PRACTICE MIDWIFE

## 2024-09-23 PROCEDURE — 99213 OFFICE O/P EST LOW 20 MIN: CPT | Performed by: ADVANCED PRACTICE MIDWIFE

## 2024-09-23 PROCEDURE — G0145 SCR C/V CYTO,THINLAYER,RESCR: HCPCS | Performed by: ADVANCED PRACTICE MIDWIFE

## 2024-09-23 PROCEDURE — G0008 ADMIN INFLUENZA VIRUS VAC: HCPCS

## 2024-09-23 PROCEDURE — 99395 PREV VISIT EST AGE 18-39: CPT | Performed by: ADVANCED PRACTICE MIDWIFE

## 2024-09-23 PROCEDURE — 90656 IIV3 VACC NO PRSV 0.5 ML IM: CPT

## 2024-09-23 RX ORDER — PAROXETINE 10 MG/1
10 TABLET, FILM COATED ORAL DAILY
Qty: 90 TABLET | Refills: 3 | Status: SHIPPED | OUTPATIENT
Start: 2024-09-23

## 2024-09-23 RX ORDER — RIZATRIPTAN BENZOATE 5 MG/1
5 TABLET, ORALLY DISINTEGRATING ORAL
Qty: 10 TABLET | Refills: 3 | Status: SHIPPED | OUTPATIENT
Start: 2024-09-23

## 2024-09-23 RX ORDER — PROPRANOLOL HCL 60 MG
60 CAPSULE, EXTENDED RELEASE 24HR ORAL DAILY
Qty: 90 CAPSULE | Refills: 3 | Status: SHIPPED | OUTPATIENT
Start: 2024-09-23

## 2024-09-23 ASSESSMENT — ANXIETY QUESTIONNAIRES
GAD7 TOTAL SCORE: 2
8. IF YOU CHECKED OFF ANY PROBLEMS, HOW DIFFICULT HAVE THESE MADE IT FOR YOU TO DO YOUR WORK, TAKE CARE OF THINGS AT HOME, OR GET ALONG WITH OTHER PEOPLE?: NOT DIFFICULT AT ALL
7. FEELING AFRAID AS IF SOMETHING AWFUL MIGHT HAPPEN: NOT AT ALL

## 2024-09-23 NOTE — PROGRESS NOTES
"    Progress Note    SUBJECTIVE:  Dorothy Walker is an 29 year old, , who requests an Annual Preventive Exam.       Concerns today include: worried a bit about pap smear. Hx of difficult experience with leep in 2018  Needs med refills  Current migraine meds continue to help  Stable on Paxil    Just stopped nuvaring 2+weeks ago, wants to be without hormones and hopes for migraine improvement   planning vasectomy, had consult recently  Plans NFP for now    Menstrual History:      11/3/2022     3:00 PM 2023     1:20 PM 2024     2:00 PM   Menstrual History   LAST MENSTRUAL PERIOD 10/28/2022 9/10/2023 9/10/2024       Last  No results found for: \"PAP\"  History of abnormal Pap smear: YES - reflected in Problem List and Health Maintenance accordingly    Last   Lab Results   Component Value Date    HPV16 Negative 2023     Last   Lab Results   Component Value Date    HPV18 Negative 2023     Last   Lab Results   Component Value Date    HRHPV Positive 2023       Mammogram current: not applicable  Last Mammogram:   No results found.     Last Colonoscopy:  No results found for this or any previous visit.      HISTORY:  Prescription Medications as of 2024         Rx Number Disp Refills Start End Last Dispensed Date Next Fill Date Owning Pharmacy    PARoxetine (PAXIL) 10 MG tablet  90 tablet 3 2024 --   [id number] - [place], [place] - [address] AT [facility]    Sig: Take 1 tablet (10 mg) by mouth daily.    Class: E-Prescribe    Route: Oral    propranolol ER (INDERAL LA) 60 MG 24 hr capsule  90 capsule 3 2024 --   [id number] - [place], [place] - [address] AT [facility]    Sig: Take 1 capsule (60 mg) by mouth daily.    Class: E-Prescribe    Route: Oral    rizatriptan (MAXALT-MLT) 5 MG ODT  10 tablet 3 2024 --   [id number] - [place], [place] - [address] AT [facility]    Sig: Take 1 tablet (5 " mg) by mouth at onset of headache for migraine.    Class: E-Prescribe    Route: Oral    triamcinolone (KENALOG) 0.1 % paste  5 g 1 2024 --   [facility][id number] - [place], [place] - [address] AT [facility]    Sig: APPLY TO ORAL ULCERS TWICE DAILY AS NEEDED    Class: E-Prescribe    Cyanocobalamin (B-12 PO)  -- --  --       Class: Historical    Route: Oral    dexAMETHasone (DECADRON) 0.5 MG/5ML elixir  237 mL 0 2024 --   [facility] #[id number] - [place], [place] - [address] AT [facility]    Si mL swish and spit three to four times daily for 3 days. Keep the medication in the mouth for five minutes prior to spitting it out. Do not rinse afterward and avoid eating or drinking for 30 minutes    Class: E-Prescribe          Allergies   Allergen Reactions    Penicillins Rash    Amoxicillin      Immunization History   Administered Date(s) Administered    COVID-19 Monovalent 18+ (Moderna) 2021, 2021    Influenza (IIV3) PF 10/01/2021    Influenza Vaccine >6 months,quad, PF 2023    Influenza Vaccine, 6+MO IM (QUADRIVALENT W/PRESERVATIVES) 2021    Influenza, Split Virus, Trivalent, Pf (Fluzone\Fluarix) 2024    TDAP (Adacel,Boostrix) 2021       OB History    Para Term  AB Living   0 0 0 0 0 0   SAB IAB Ectopic Multiple Live Births   0 0 0 0 0     Past Medical History:   Diagnosis Date    Abnormal Pap smear of cervix 2017    Had LEEP in 2018    History of human papillomavirus infection 2017    Had on and off positives on pap smears for years    Migraine     2xmos since early , uses meds    Migraines 2017    OCD (obsessive compulsive disorder)     PVC's (premature ventricular contractions)     SVT (supraventricular tachycardia) (H24) 2016    started on metoprolol - then switched to propranolol     Past Surgical History:   Procedure Laterality Date    ENT SURGERY      Tonsillectomy and adenoidectomy    LEEP TX, CERVICAL  2018     ORTHOPEDIC SURGERY  2011    Clavical fracture repair. Still have plate     Family History   Problem Relation Age of Onset    Hyperlipidemia Mother     Breast Cancer Mother 58        HER2+    Diabetes Type 2  Father     Hypertension Father     Substance Abuse Father         Alcoholic    Heart Disease Maternal Grandmother     Heart Disease Paternal Grandmother     Diabetes Type 2  Paternal Grandmother     Hypertension Paternal Grandmother     Heart Disease Paternal Grandfather     Diabetes Type 2  Paternal Grandfather     Hypertension Paternal Grandfather      Social History     Socioeconomic History    Marital status:      Spouse name: None    Number of children: None    Years of education: None    Highest education level: None   Tobacco Use    Smoking status: Never    Smokeless tobacco: Never   Vaping Use    Vaping status: Never Used   Substance and Sexual Activity    Alcohol use: Not Currently     Comment: Quit 2020    Drug use: Never    Sexual activity: Yes     Partners: Male     Birth control/protection: Pull-out method, Condom, Natural Family Planning     Social Determinants of Health     Financial Resource Strain: Low Risk  (9/21/2023)    Financial Resource Strain     Within the past 12 months, have you or your family members you live with been unable to get utilities (heat, electricity) when it was really needed?: No   Food Insecurity: Low Risk  (9/21/2023)    Food Insecurity     Within the past 12 months, did you worry that your food would run out before you got money to buy more?: No     Within the past 12 months, did the food you bought just not last and you didn t have money to get more?: No   Transportation Needs: Low Risk  (9/21/2023)    Transportation Needs     Within the past 12 months, has lack of transportation kept you from medical appointments, getting your medicines, non-medical meetings or appointments, work, or from getting things that you need?: No   Interpersonal Safety: Low Risk   "(4/18/2024)    Interpersonal Safety     Do you feel physically and emotionally safe where you currently live?: Yes     Within the past 12 months, have you been hit, slapped, kicked or otherwise physically hurt by someone?: No     Within the past 12 months, have you been humiliated or emotionally abused in other ways by your partner or ex-partner?: No   Housing Stability: Low Risk  (9/21/2023)    Housing Stability     Do you have housing? : Yes     Are you worried about losing your housing?: No       ROS      11/3/2022     6:18 AM   PHQ-9 SCORE   PHQ-9 Total Score 1         EXAM:  Blood pressure 108/76, pulse 71, height 1.581 m (5' 2.25\"), weight 58.9 kg (129 lb 12.8 oz), last menstrual period 09/10/2024. Body mass index is 23.55 kg/m .  General - pleasant female in no acute distress.  Skin - no suspicious lesions or rashes  EENT-  PERRLA, euthyroid with out palpable nodules  Neck - supple without lymphadenopathy.  Lungs - clear to auscultation bilaterally.  Heart - regular rate and rhythm without murmur.  Abdomen - soft, nontender, nondistended, no masses or organomegaly noted.  Musculoskeletal - no gross deformities.  Neurological - normal strength, sensation, and mental status.    Breast Exam:  Breast: Without visible skin changes. No dimpling or lesions seen.   Breasts supple, non-tender with palpation, no dominant mass, nodularity, or nipple discharge noted bilaterally. Axillary nodes negative.      Pelvic Exam:  EG/BUS: Normal genital architecture without lesions, erythema or abnormal secretions Bartholin's, Urethra, [place]'s normal   Urethral meatus: normal   Urethra: no masses, tenderness, or scarring   Bladder: no masses or tenderness   Vagina: moist, pink, rugae with creamy, white, and odorless  secretions  Cervix: no lesions and pink, moist, closed, without lesion or CMT      ASSESSMENT:    Encounter Diagnoses   Name Primary?    H/O LEEP     History of palpitations     Migraine with aura and without status " migrainosus, not intractable     Mixed obsessional thoughts and acts     Screening for malignant neoplasm of cervix     Encounter for gynecological examination without abnormal finding     Visit for preventive health examination Yes    Palpitations     Migraine without aura and without status migrainosus, not intractable           PLAN:   Orders Placed This Encounter   Procedures    Pelvic and Breast Exam Procedure []    Pap Smear Exam [] Do Not Remove    INFLUENZA VACCINE, SPLIT VIRUS, TRIVALENT,PF (FLUZONE\FLUARIX)    HPV and Gynecologic Cytology Panel - Recommended Age 30 - 65 Years (Select HPV order below)        Additional teaching done at this visit regarding self breast exam, birth control, preconception, and mental health.    Return to clinic in one year.  Follow-up as needed.    Answers submitted by the patient for this visit:  Patient Health Questionnaire (G7) (Submitted on 9/23/2024)  JUNE 7 TOTAL SCORE: 2  Katlyn Samuel, DNP, APRN, CNM, FACNM

## 2024-09-23 NOTE — LETTER
"2024       RE: Dorothy Walker   [address]  [place] [address]     Dear Colleague,    Thank you for referring your patient, Dorothy Walker, to the [facility] [place] at [facility]. Please see a copy of my visit note below.        Progress Note    SUBJECTIVE:  Dorothy Walker is an 29 year old, , who requests an Annual Preventive Exam.       Concerns today include: worried a bit about pap smear. Hx of difficult experience with leep in 2018  Needs med refills  Current migraine meds continue to help  Stable on Paxil    Just stopped nuvaring 2+weeks ago, wants to be without hormones and hopes for migraine improvement   planning vasectomy, had consult recently  Plans NFP for now    Menstrual History:      11/3/2022     3:00 PM 2023     1:20 PM 2024     2:00 PM   Menstrual History   LAST MENSTRUAL PERIOD 10/28/2022 9/10/2023 9/10/2024       Last  No results found for: \"PAP\"  History of abnormal Pap smear: YES - reflected in Problem List and Health Maintenance accordingly    Last   Lab Results   Component Value Date    HPV16 Negative 2023     Last   Lab Results   Component Value Date    HPV18 Negative 2023     Last   Lab Results   Component Value Date    HRHPV Positive 2023       Mammogram current: not applicable  Last Mammogram:   No results found.     Last Colonoscopy:  No results found for this or any previous visit.      HISTORY:  Prescription Medications as of 2024         Rx Number Disp Refills Start End Last Dispensed Date Next Fill Date Owning Pharmacy    PARoxetine (PAXIL) 10 MG tablet  90 tablet 3 2024 --   [facility] #[id number] - [place], [place] - [address] AT [facility] [address]    Sig: Take 1 tablet (10 mg) by mouth daily.    Class: E-Prescribe    Route: Oral    propranolol ER (INDERAL LA) 60 MG 24 hr capsule  90 capsule 3 2024 --   [facility] #[id number] - [place] [address] AT [facility]    Sig: Take 1 capsule (60 mg) by mouth daily.    Class: E-Prescribe    Route: Oral    rizatriptan (MAXALT-MLT) 5 MG ODT  10 tablet 3 2024 --   [facility] #[id number] - [place], [place] - [address] AT [facility]    Sig: Take 1 tablet (5 mg) by mouth at onset of headache for migraine.    Class: E-Prescribe    Route: Oral    triamcinolone (KENALOG) 0.1 % paste  5 g 1 2024 --   [facility] #[id number] - [place], [place] - [address] AT [facility]    Sig: APPLY TO ORAL ULCERS TWICE DAILY AS NEEDED    Class: E-Prescribe    Cyanocobalamin (B-12 PO)  -- --  --       Class: Historical    Route: Oral    dexAMETHasone (DECADRON) 0.5 MG/5ML elixir  237 mL 0 2024 --   [facility] #[id number] - [place], [place] - [address] AT [facility]    Si mL swish and spit three to four times daily for 3 days. Keep the medication in the mouth for five minutes prior to spitting it out. Do not rinse afterward and avoid eating or drinking for 30 minutes    Class: E-Prescribe          Allergies   Allergen Reactions     Penicillins Rash     Amoxicillin      Immunization History   Administered Date(s) Administered     COVID-19 Monovalent 18+ (Moderna) 2021, 2021     Influenza (IIV3) PF 10/01/2021     Influenza Vaccine >6 months,quad, PF 2023     Influenza Vaccine, 6+MO IM (QUADRIVALENT W/PRESERVATIVES) 2021     Influenza, Split Virus, Trivalent, Pf (Fluzone\Fluarix) 2024     TDAP (Adacel,Boostrix) 2021       OB History    Para Term  AB Living   0 0 0 0 0 0   SAB IAB Ectopic Multiple Live Births   0 0 0 0 0     Past Medical History:   Diagnosis Date     Abnormal Pap smear of cervix 2017    Had LEEP in 2018     History of human papillomavirus infection 2017    Had on and off positives on pap smears for years     Migraine     2xmos since early 's, uses meds     Migraines 2017     OCD  (obsessive compulsive disorder)      PVC's (premature ventricular contractions)      SVT (supraventricular tachycardia) (H24) 2016    started on metoprolol - then switched to propranolol     Past Surgical History:   Procedure Laterality Date     ENT SURGERY  2003    Tonsillectomy and adenoidectomy     LEEP TX, CERVICAL  11/2018     ORTHOPEDIC SURGERY  2011    Clavical fracture repair. Still have plate     Family History   Problem Relation Age of Onset     Hyperlipidemia Mother      Breast Cancer Mother 58        HER2+     Diabetes Type 2  Father      Hypertension Father      Substance Abuse Father         Alcoholic     Heart Disease Maternal Grandmother      Heart Disease Paternal Grandmother      Diabetes Type 2  Paternal Grandmother      Hypertension Paternal Grandmother      Heart Disease Paternal Grandfather      Diabetes Type 2  Paternal Grandfather      Hypertension Paternal Grandfather      Social History     Socioeconomic History     Marital status:      Spouse name: None     Number of children: None     Years of education: None     Highest education level: None   Tobacco Use     Smoking status: Never     Smokeless tobacco: Never   Vaping Use     Vaping status: Never Used   Substance and Sexual Activity     Alcohol use: Not Currently     Comment: Quit 2020     Drug use: Never     Sexual activity: Yes     Partners: Male     Birth control/protection: Pull-out method, Condom, Natural Family Planning     Social Determinants of Health     Financial Resource Strain: Low Risk  (9/21/2023)    Financial Resource Strain      Within the past 12 months, have you or your family members you live with been unable to get utilities (heat, electricity) when it was really needed?: No   Food Insecurity: Low Risk  (9/21/2023)    Food Insecurity      Within the past 12 months, did you worry that your food would run out before you got money to buy more?: No      Within the past 12 months, did the food you bought just not  "last and you didn t have money to get more?: No   Transportation Needs: Low Risk  (9/21/2023)    Transportation Needs      Within the past 12 months, has lack of transportation kept you from medical appointments, getting your medicines, non-medical meetings or appointments, work, or from getting things that you need?: No   Interpersonal Safety: Low Risk  (4/18/2024)    Interpersonal Safety      Do you feel physically and emotionally safe where you currently live?: Yes      Within the past 12 months, have you been hit, slapped, kicked or otherwise physically hurt by someone?: No      Within the past 12 months, have you been humiliated or emotionally abused in other ways by your partner or ex-partner?: No   Housing Stability: Low Risk  (9/21/2023)    Housing Stability      Do you have housing? : Yes      Are you worried about losing your housing?: No       ROS      11/3/2022     6:18 AM   PHQ-9 SCORE   PHQ-9 Total Score 1         EXAM:  Blood pressure 108/76, pulse 71, height 1.581 m (5' 2.25\"), weight 58.9 kg (129 lb 12.8 oz), last menstrual period 09/10/2024. Body mass index is 23.55 kg/m .  General - pleasant female in no acute distress.  Skin - no suspicious lesions or rashes  EENT-  PERRLA, euthyroid with out palpable nodules  Neck - supple without lymphadenopathy.  Lungs - clear to auscultation bilaterally.  Heart - regular rate and rhythm without murmur.  Abdomen - soft, nontender, nondistended, no masses or organomegaly noted.  Musculoskeletal - no gross deformities.  Neurological - normal strength, sensation, and mental status.    Breast Exam:  Breast: Without visible skin changes. No dimpling or lesions seen.   Breasts supple, non-tender with palpation, no dominant mass, nodularity, or nipple discharge noted bilaterally. Axillary nodes negative.      Pelvic Exam:  EG/BUS: Normal genital architecture without lesions, erythema or abnormal secretions Bartholin's, Urethra, [place]'s normal   Urethral meatus: " normal   Urethra: no masses, tenderness, or scarring   Bladder: no masses or tenderness   Vagina: moist, pink, rugae with creamy, white, and odorless  secretions  Cervix: no lesions and pink, moist, closed, without lesion or CMT      ASSESSMENT:    Encounter Diagnoses   Name Primary?     H/O LEEP      History of palpitations      Migraine with aura and without status migrainosus, not intractable      Mixed obsessional thoughts and acts      Screening for malignant neoplasm of cervix      Encounter for gynecological examination without abnormal finding      Visit for preventive health examination Yes     Palpitations      Migraine without aura and without status migrainosus, not intractable           PLAN:   Orders Placed This Encounter   Procedures     Pelvic and Breast Exam Procedure []     Pap Smear Exam [] Do Not Remove     INFLUENZA VACCINE, SPLIT VIRUS, TRIVALENT,PF (FLUZONE\FLUARIX)     HPV and Gynecologic Cytology Panel - Recommended Age 30 - 65 Years (Select HPV order below)        Additional teaching done at this visit regarding self breast exam, birth control, preconception, and mental health.    Return to clinic in one year.  Follow-up as needed.    Answers submitted by the patient for this visit:  Patient Health Questionnaire (G7) (Submitted on 9/23/2024)  JUNE 7 TOTAL SCORE: 2  Katlyn Samuel, TIMA, APRN, CNM, FACNM        Again, thank you for allowing me to participate in the care of your patient.      Sincerely,    MAYELA Mccain CNM

## 2024-09-23 NOTE — PATIENT INSTRUCTIONS
Thank you for trusting us with your care!   Please be aware, if you are on Mychart, you may see your results prior to your providers review. If labs are abnormal, we will call or message you on Mychart with a follow up plan.    If you need to contact us for questions about:  Symptoms, Scheduling & Medical Questions; Non-urgent (2-3 day response) Mychart message, Urgent (needing response today) [phone] (if after 3:30pm next day response)   Prescriptions: Please call your Pharmacy   Billing: [facility] [phone] or [facility]:[phone]   PREVENTIVE HEALTH RECOMMENDATIONS:   Most women need a yearly breast and pelvic exam.    A PAP screen, a test done DURING a pelvic exam, is NO longer recommended yearly.    March 2013, screening guidelines recommended by ACOG for PAP screen are:    1) First pap at age 21.    2) Pap every 3 years until age 30.    3) After age 30, pap every 3 years or Pap with HR HPV screen every 5 years until age 65.  4) Women do NOT need a vaginal Pap screen after a hysterectomy (surgical removal of the uterus) when they have not had cancer.    Exceptions:  1) Yearly pap if HIV+ or immunosuppressed secondary to organ transplant  2) CHINO II-III continue routine screening for 20 years.    I encourage you continue looking for opportunities to choose a healthy lifestyle:       * Choose to eat a heart healthy diet. Check out the FOOD PLATE guidelines at: http://www.choosemyplate.gov/ for helpful hints on weight and cholesterol management.  Balance your caloric intake with exercise to maintain a BMI in the 22 to 26 range. For bone health: Eat calcium-rich foods like yogurt, broccoli or take chewable calcium pills (500 to 600 mg) twice a day with food.       * Exercise for at least an average of 30 minutes a day, 5 days of the week. This will help you control your weight, release stress, and help prevent disease.      * Take a Vitamin D3 supplement daily fall through spring and during summer unless  you [id number]' full body sun exposure to skin without sunscreen.      * DO wear sunscreen to prevent skin cancer after the first 15-30 minutes.      * Identify stressors in your life, find ways to release the stress, and, make time for yourself. PLEASE ask for help if mood changes last longer than two weeks.     * Limit alcohol to one drink per day.  No smoking.  Avoid second hand smoke. If you smoke, ask for help to stop.       *  If you are in a sexual relationship, talk with your partner about possible infection risks and take action to protect yourself from exposure to a sexual infection.    Please request an infection screen for STIs (sexually transmitted infections) if you are less than age 26 OR believe that you may be at risk.     Get a flu shot each year. Get a tetanus shot every 10 years. EVERYONE needs a pertussis (Whooping cough) booster.    See your dentist twice a year for an exam and preventive care cleaning.     Consider the following screen tests:    1) cholesterol test every 5 years.     2) yearly mammogram after age 40 unless you have identified risks.    3) colonoscopy every 10 years after age 50 unless you have identified risks.    4) diabetes blood test screening if you are at risk for diabetes.      Additional information that you may also find helpful:  The Internet now gives us access to LOTS of information -- some of it helpful, research documented and also plenty of harmful, anecdotal information that may not pertain to your situtaion. Consider visiting the following websites for accurate health information:    www.vitamindcouncil.org/ : Info and ongoing research re Vitamin D    www.fairview.org : Up to date and easily searchable information on multiple topics.    www.medlineplus.gov : medication info, interactive tutorials, watch real surgeries online    www.cdc.gov : public health info, travel advisories, epidemics (H1N1)    www.ashia/std.org: current research re diagnosis, treatment and  prevention of sexually contacted infections.    www.health.[facility].[place] : [place] dept of heatl, public health issues in [place], N1N1    www.familydoctor.org : good info from the Academy of Family Physicians

## 2024-09-24 DIAGNOSIS — Z30.09 ENCOUNTER FOR GENERAL COUNSELING AND ADVICE ON CONTRACEPTIVE MANAGEMENT: ICD-10-CM

## 2024-09-24 LAB
HPV HR 12 DNA CVX QL NAA+PROBE: NEGATIVE
HPV16 DNA CVX QL NAA+PROBE: NEGATIVE
HPV18 DNA CVX QL NAA+PROBE: NEGATIVE
HUMAN PAPILLOMA VIRUS FINAL DIAGNOSIS: NORMAL

## 2024-09-24 RX ORDER — ETONOGESTREL AND ETHINYL ESTRADIOL VAGINAL RING .015; .12 MG/D; MG/D
RING VAGINAL
OUTPATIENT
Start: 2024-09-24

## 2024-09-26 LAB
BKR AP ASSOCIATED HPV REPORT: NORMAL
BKR LAB AP GYN ADEQUACY: NORMAL
BKR LAB AP GYN INTERPRETATION: NORMAL
BKR LAB AP LMP: NORMAL
BKR LAB AP PREVIOUS ABNL DX: NORMAL
BKR LAB AP PREVIOUS ABNORMAL: NORMAL
PATH REPORT.COMMENTS IMP SPEC: NORMAL
PATH REPORT.COMMENTS IMP SPEC: NORMAL
PATH REPORT.RELEVANT HX SPEC: NORMAL

## 2024-09-27 ENCOUNTER — PATIENT OUTREACH (OUTPATIENT)
Dept: OBGYN | Facility: CLINIC | Age: 29
End: 2024-09-27
Payer: COMMERCIAL

## 2025-05-27 ENCOUNTER — MYC MEDICAL ADVICE (OUTPATIENT)
Dept: OBGYN | Facility: CLINIC | Age: 30
End: 2025-05-27
Payer: COMMERCIAL

## 2025-05-28 ENCOUNTER — TRANSCRIBE ORDERS (OUTPATIENT)
Dept: MATERNAL FETAL MEDICINE | Facility: CLINIC | Age: 30
End: 2025-05-28
Payer: COMMERCIAL

## 2025-05-28 DIAGNOSIS — Z31.69 ENCOUNTER FOR PRECONCEPTION CONSULTATION: Primary | ICD-10-CM

## 2025-06-18 ENCOUNTER — MEDICAL CORRESPONDENCE (OUTPATIENT)
Dept: HEALTH INFORMATION MANAGEMENT | Facility: CLINIC | Age: 30
End: 2025-06-18

## 2025-06-18 ENCOUNTER — OFFICE VISIT (OUTPATIENT)
Dept: MATERNAL FETAL MEDICINE | Facility: CLINIC | Age: 30
End: 2025-06-18
Attending: STUDENT IN AN ORGANIZED HEALTH CARE EDUCATION/TRAINING PROGRAM
Payer: COMMERCIAL

## 2025-06-18 ENCOUNTER — LAB (OUTPATIENT)
Dept: LAB | Facility: CLINIC | Age: 30
End: 2025-06-18
Attending: STUDENT IN AN ORGANIZED HEALTH CARE EDUCATION/TRAINING PROGRAM
Payer: COMMERCIAL

## 2025-06-18 DIAGNOSIS — Z31.438 ENCOUNTER FOR OTHER GENETIC TESTING OF FEMALE FOR PROCREATIVE MANAGEMENT: ICD-10-CM

## 2025-06-18 DIAGNOSIS — Z31.69 ENCOUNTER FOR PRECONCEPTION CONSULTATION: ICD-10-CM

## 2025-06-18 DIAGNOSIS — Z31.438 ENCOUNTER FOR OTHER GENETIC TESTING OF FEMALE FOR PROCREATIVE MANAGEMENT: Primary | ICD-10-CM

## 2025-06-18 PROCEDURE — 36415 COLL VENOUS BLD VENIPUNCTURE: CPT

## 2025-06-18 PROCEDURE — 96041 GENETIC COUNSELING SVC EA 30: CPT

## 2025-06-18 NOTE — PROGRESS NOTES
"[facility]  Genetic Counseling Consult    Patient:  Dorothy Walker YOB: 1995   Date of Service:  25   MRN: [id number]    Dorothy was seen at the [facility] for preconception genetic consultation. The indication for genetic counseling is desire to discuss carrier screening . The patient was accompanied to this visit by their partner, Epi.    The session was conducted in English.        IMPRESSION/ PLAN   During today's [employer] visit, Dorothy and Epi elected to pursue expanded carrier screening for 267 conditions through [employer]. Epi's screening will be for 266 conditions as it will exclude fragile X. Results are expected within 2-3 weeks, and will be available in [employer].  We will contact the couple to discuss the results. The couple requested that we contact Dorothy with results once both are available. She provided verbal permission for results to be left in her voicemail. Authorization to share protected health information was signed today's visit.     PREGNANCY HISTORY   /Parity:        MEDICAL HISTORY   Dorothy s reported medical history is not expected to impact pregnancy management or risks to fetal development.       FAMILY HISTORY   A three-generation family history was obtained today and is scanned under the \"Media\" tab in Epic. The family history was reported by Dorothy and Epi.    The following significant findings were reported today:   Dorothy's mother has a history of breast cancer diagnosed at 58. She underwent genetic testing for hereditary breast cancer predisposition genes which detected a pathogenic variant in CHEK2. Dorothy was previously referred to [facility] risk management [facility] to meet with a genetic counselor to discuss options for testing. Dorothy shared she is still considering testing. We reviewed that the [facility] cancer risk management group does not require a " referral. One of Dorothy's maternal aunts also has a history of breast cancer diagnosed in her 60s and then an abdominal cancer (gallbladder or pancreatic) in her 70s. One of Dorothy's paternal first cousins has a history of testicular cancer diagnosed in his 20s. Epi's father has a history of multiple myeloma diagnosed around 45-50. Epi's paternal grandmother also had a history of multiple myeloma and she passed in her 70s.   We briefly discussed the family history of cancer. Cancer most often occurs by chance, however some families seem to develop cancer more frequently than expected. We discussed that hereditary cancer predisposition syndromes often cause cancer diagnoses in multiple relatives in multiple generations at earlier ages. Genetic counseling is available for cancer syndromes. Cancer family history, even without genetic testing, can change cancer screening recommendations for family members and aid in insurance coverage for access to them as well. The most informative individuals to complete cancer genetic counseling and genetic testing are those with a personal history of cancer or those closely related to the affected individuals. We reviewed that if the family wants more information they can contact the [facility] Cancer Risk Management Program ([phone]).    Epi is 38 and has a history of psoriasis. His mother also has a history of psoriasis. One of his brothers has a history of vitiligo.  We reviewed that autoimmune disorders are multifactorial in nature, resulting from both genetic and non-genetic factors. Once an autoimmune disease is present in a family, other relatives may be at increased risk to develop the same disease or a different autoimmune disease.  Presymptomatic and prenatal testing are not available for autoimmune disorders.   Dorothy has a personal history of OCD. Epi's mother has a personal history of bipolar disorder.  We discussed how mental health conditions  are thought to be inherited in a multifactorial fashion, meaning many factors are involved in the development of these conditions. These factors usually include both genetic and environmental aspects and a combination of these aspects lead to symptoms. Given that there is a genetic component, the couple's children may be at increased risk to develop a mental health condition and were encouraged to share this information with their pediatrician and have awareness for early signs and symptoms.       Otherwise, the reported family history is unremarkable for multiple miscarriages, stillbirths, birth defects, intellectual disabilities, autism spectrum disorder, developmental delays, cancer diagnosed under 50, known genetic conditions, and consanguinity.       CARRIER SCREENING   Expanded carrier screening is available to screen for autosomal recessive conditions and X-linked conditions in a large list of genes. Carrier screening does not test the pregnancy but gives a risk assessment for the pregnancy and future pregnancies to have the condition. Expanded carrier screening is designed to identify carrier status for conditions that are primarily childhood or adolescent onset. Expanded carrier screening does not evaluate for adult-onset conditions such as hereditary cancer syndromes, dementia/Alzheimer's disease, or cardiovascular disease risk factors. Additionally, expanded carrier screening is not comprehensive for all known genetic diseases or inherited conditions. Carrier screening does not test for all genetic and health conditions or risk factors.     Autosomal recessive conditions happen when a mutation has been inherited from the egg and sperm and include conditions like cystic fibrosis, thalassemia, hearing loss, spinal muscular atrophy, and more. We reviewed that when both biological parents carry a harmful genetic change in a gene associated with autosomal recessive inheritance, each of their pregnancies has a  1 in 4 (25%) chance to be affected by that condition. X-linked conditions happen when a mutation has been inherited from the egg and include conditions like fragile X syndrome.With X-linked conditions, the specific risk generally depends on the chromosomal sex of the fetus, with XY individuals (generally male) being most severely affected.      screening was reviewed. About [place] [employer] Screening    The patient does NOT have a family history of known autosomal recessively inherited conditions. This does NOT mean the patient and/or their partner is not a carrier of a condition. Approximately 90% of couples at an increased reproductive risk for an inherited condition have no family history of that condition.     The patient has not had carrier screening previously. The patient elected to pursue carrier screening today. The screening will include 267 conditions (Universal Plus panel) through [employer] See below for the more detailed information we discussed.   Carrier screening does not test the pregnancy but gives a risk assessment for the pregnancy and future pregnancies to have the condition  If both partners are carriers of the same condition, there is a 1 in 4 (25%) chance for any pregnancies they have together to have the condition   There are different size panels or list of conditions for carrier screening  We discussed examples of various conditions that can be detected through carrier screening  Some conditions cause health problems for carriers  We discussed the Genetic Information Nondiscrimination Act ([place]IN[place]) and important gaps in the protections it affords   Carrier screening does not test for all genetic and health conditions or risk factors  There are limitations to current technology and results may be updated at a later date  If an individual is a carrier, family members could be as well. The patient is encouraged to share this information with relatives.  We discussed examples of  reasons why couples elect or decline carrier screening  Some couples elect to pursue carrier screening as this information can inform pregnancy decisions, financial and logistical planning related to the current pregnancy, and future family planning  Other couples elect not to pursue carrier screening as this information feels overwhelming, it would not change pregnancy management for them, or they are worried about cost/insurance      We reviewed the benefits and limitations of this testing.  Screening tests provide a risk assessment specific to the pregnancy for certain fetal chromosome abnormalities, but cannot definitively diagnose or exclude a fetal chromosome abnormality.  Follow-up genetic counseling and consideration of diagnostic testing is recommended with any abnormal screening result.      Diagnostic tests carry inherent risks- including risk of miscarriage- that require careful consideration.  These tests can detect fetal chromosome abnormalities with greater than 99% certainty.  Results can be compromised by maternal cell contamination or mosaicism, and are limited by the resolution of cytogenetic G-banding technology.  There is no screening nor diagnostic test that can detect all forms of birth defects or mental disability.    It was a pleasure to be involved with Dorothy s care. I spent 75 minutes on the date of the encounter doing chart review, obtaining history, test coordination, documentation, and further activities as noted above.      Crystal Sepulveda MS, [facility]  Maternal Fetal Medicine  Office: [phone]  [facility]: [phone]   Fax: [phone]  [facility]

## 2025-06-24 ENCOUNTER — OFFICE VISIT (OUTPATIENT)
Dept: FAMILY MEDICINE | Facility: CLINIC | Age: 30
End: 2025-06-24
Attending: FAMILY MEDICINE
Payer: COMMERCIAL

## 2025-06-24 VITALS
DIASTOLIC BLOOD PRESSURE: 82 MMHG | HEART RATE: 109 BPM | SYSTOLIC BLOOD PRESSURE: 125 MMHG | WEIGHT: 132 LBS | HEIGHT: 62 IN | BODY MASS INDEX: 24.29 KG/M2

## 2025-06-24 DIAGNOSIS — R00.2 PALPITATIONS: ICD-10-CM

## 2025-06-24 DIAGNOSIS — G43.009 MIGRAINE WITHOUT AURA AND WITHOUT STATUS MIGRAINOSUS, NOT INTRACTABLE: ICD-10-CM

## 2025-06-24 DIAGNOSIS — F42.2 MIXED OBSESSIONAL THOUGHTS AND ACTS: Primary | ICD-10-CM

## 2025-06-24 PROCEDURE — 99213 OFFICE O/P EST LOW 20 MIN: CPT | Performed by: FAMILY MEDICINE

## 2025-06-24 ASSESSMENT — ANXIETY QUESTIONNAIRES
1. FEELING NERVOUS, ANXIOUS, OR ON EDGE: SEVERAL DAYS
GAD7 TOTAL SCORE: 3
3. WORRYING TOO MUCH ABOUT DIFFERENT THINGS: SEVERAL DAYS
5. BEING SO RESTLESS THAT IT IS HARD TO SIT STILL: NOT AT ALL
7. FEELING AFRAID AS IF SOMETHING AWFUL MIGHT HAPPEN: NOT AT ALL
6. BECOMING EASILY ANNOYED OR IRRITABLE: NOT AT ALL
2. NOT BEING ABLE TO STOP OR CONTROL WORRYING: SEVERAL DAYS
GAD7 TOTAL SCORE: 3

## 2025-06-24 ASSESSMENT — PATIENT HEALTH QUESTIONNAIRE - PHQ9
SUM OF ALL RESPONSES TO PHQ QUESTIONS 1-9: 0
5. POOR APPETITE OR OVEREATING: NOT AT ALL

## 2025-06-24 ASSESSMENT — PAIN SCALES - GENERAL: PAINLEVEL_OUTOF10: NO PAIN (0)

## 2025-06-24 NOTE — PROGRESS NOTES
"CC: RECHECK      SUBJECTIVE: Dorothy is a 30 year old female with who comes in to discuss medications, plans on TTC with next cycle. Is on PNV. Mostly regular cycles since stopping birth control 8 months ago - although last two cycles ovulated a bit later than usual.      Has been weaning off paroxetine for mixed obsessional thoughts and acts  She was on paroxetine 10 mg daily   Has been weaning and is now down to 5 mg every other day  OCD sx not flared up  No selective serotonin reuptake inhibitor discontinuation symptoms that she can tell - maybe slightly more brain fog?    She has also been weaning propranolol 60 mg every other day for 2 weeks  Took a dose yesterday and planning on not taking it from here on   Can feel her heart beat a little bit more  Doesn't feel irregular   She does have a history of PACs and PVCs and migraines and was using the propranolol to help with both   She does usually wear an [employer] Watch  Has noticed with exercise, while on propranolol she could only get to max 140 bpm, but now can go higher  Maybe having a migraine once per month (perhaps related to stopping birth control 8 months ago?)      OBJECTIVE:   /82   Pulse 109   Ht 1.575 m (5' 2.01\")   Wt 59.9 kg (132 lb)   LMP 05/28/2025   BMI 24.14 kg/m    General: Alert and oriented in no acute distress.  Cardio: RRR, normal S1 and S2, without murmurs, rubs, or gallops appreciated.    Resp: CTA bilaterally. Normal respiratory effort.   Psych: Mood and affect appropriate.      ASSESSMENT/PLAN:   Dorothy was seen today for recheck.    Diagnoses and all orders for this visit:    Mixed obsessional thoughts and acts    Migraine without aura and without status migrainosus, not intractable    Palpitations    Has been doing well as she tapers off both paroxetine and propranolol. Side effects of tapering reviewed.   Discussed if OCD symptoms worsen, consider sertraline or fluoxetine.    Consider magnesium and riboflavin supplementation if " migraines worsen.   In pregnancy, discussed trying Tylenol & caffeine if she has a migraine, and occasional rizatriptan as a risk/benefit discussion.  She will monitor if palpitations/PACs/PVCs become more frequent with stopping propranolol. Could consider labetalol.     Worrisome signs and symptoms were discussed with Dorothy and she was instructed to return to the clinic for concerning symptoms or to call with questions.    The longitudinal plan of care for the diagnosis(es)/condition(s) as documented were addressed during this visit. Due to the added complexity in care, I will continue to support Dorothy in the subsequent management and with ongoing continuity of care.    Time note (e3, 20'): The total time (on the date of service) for this service was 29 minutes, including discussion/face-to-face, chart review, interpretation not otherwise reported, documentation, and updating of the computerized record.        Mireille Pena MD  Family Medicine

## 2025-06-24 NOTE — PATIENT INSTRUCTIONS
- Magnesium 250 mg at bedtime  - Riboflavin 400 mg at bedtime   If your migraines start to be a problem

## 2025-06-30 ENCOUNTER — TELEPHONE (OUTPATIENT)
Facility: CLINIC | Age: 30
End: 2025-06-30
Payer: COMMERCIAL

## 2025-06-30 LAB — SCANNED LAB RESULT: NORMAL

## 2025-06-30 NOTE — TELEPHONE ENCOUNTER
June 30, 2025    I called Dorothy to review her and her partner, Epi's, expanded carrier screening results. The couple elected expanded carrier screening for 267 conditions through [facility]. Epi was screened for 266 conditions as his testing excluded the X-linked condition Fragile X. Both Dorothy and Epi's carrier screening results were completely NEGATIVE. Neither of them were found to be carriers of any of the conditions they were screened for. Dorothy had no questions at this time and she was encouraged to reach out if any questions do arise.    Crystal Sepulveda MS, [facility]  Maternal Fetal Medicine  Office: [phone]  [facility]: [phone]   Fax: [phone]  [facility]

## 2025-09-04 ENCOUNTER — PATIENT OUTREACH (OUTPATIENT)
Dept: OBGYN | Facility: CLINIC | Age: 30
End: 2025-09-04
Payer: COMMERCIAL